# Patient Record
Sex: MALE
[De-identification: names, ages, dates, MRNs, and addresses within clinical notes are randomized per-mention and may not be internally consistent; named-entity substitution may affect disease eponyms.]

---

## 2023-04-12 ENCOUNTER — RESULT REVIEW (OUTPATIENT)
Age: 38
End: 2023-04-12

## 2023-04-12 ENCOUNTER — NON-APPOINTMENT (OUTPATIENT)
Age: 38
End: 2023-04-12

## 2023-04-12 ENCOUNTER — APPOINTMENT (OUTPATIENT)
Dept: FAMILY MEDICINE | Facility: CLINIC | Age: 38
End: 2023-04-12
Payer: MEDICAID

## 2023-04-12 VITALS
DIASTOLIC BLOOD PRESSURE: 98 MMHG | HEART RATE: 124 BPM | BODY MASS INDEX: 31.92 KG/M2 | SYSTOLIC BLOOD PRESSURE: 150 MMHG | HEIGHT: 71 IN | OXYGEN SATURATION: 98 % | TEMPERATURE: 98.3 F | WEIGHT: 228 LBS

## 2023-04-12 DIAGNOSIS — Z00.00 ENCOUNTER FOR GENERAL ADULT MEDICAL EXAMINATION W/OUT ABNORMAL FINDINGS: ICD-10-CM

## 2023-04-12 DIAGNOSIS — Z15.89 GENETIC SUSCEPTIBILITY TO OTHER DISEASE: ICD-10-CM

## 2023-04-12 DIAGNOSIS — Z13.29 ENCOUNTER FOR SCREENING FOR OTHER SUSPECTED ENDOCRINE DISORDER: ICD-10-CM

## 2023-04-12 DIAGNOSIS — Z13.1 ENCOUNTER FOR SCREENING FOR DIABETES MELLITUS: ICD-10-CM

## 2023-04-12 DIAGNOSIS — F32.A DEPRESSION, UNSPECIFIED: ICD-10-CM

## 2023-04-12 DIAGNOSIS — Z78.9 OTHER SPECIFIED HEALTH STATUS: ICD-10-CM

## 2023-04-12 DIAGNOSIS — Z13.220 ENCOUNTER FOR SCREENING FOR LIPOID DISORDERS: ICD-10-CM

## 2023-04-12 DIAGNOSIS — Z87.19 PERSONAL HISTORY OF OTHER DISEASES OF THE DIGESTIVE SYSTEM: ICD-10-CM

## 2023-04-12 DIAGNOSIS — G89.29 LOW BACK PAIN, UNSPECIFIED: ICD-10-CM

## 2023-04-12 DIAGNOSIS — K76.0 FATTY (CHANGE OF) LIVER, NOT ELSEWHERE CLASSIFIED: ICD-10-CM

## 2023-04-12 DIAGNOSIS — K62.5 HEMORRHAGE OF ANUS AND RECTUM: ICD-10-CM

## 2023-04-12 DIAGNOSIS — Z82.69 FAMILY HISTORY OF OTHER DISEASES OF THE MUSCULOSKELETAL SYSTEM AND CONNECTIVE TISSUE: ICD-10-CM

## 2023-04-12 DIAGNOSIS — M54.50 LOW BACK PAIN, UNSPECIFIED: ICD-10-CM

## 2023-04-12 DIAGNOSIS — F17.200 NICOTINE DEPENDENCE, UNSPECIFIED, UNCOMPLICATED: ICD-10-CM

## 2023-04-12 DIAGNOSIS — R73.03 PREDIABETES.: ICD-10-CM

## 2023-04-12 PROCEDURE — 93000 ELECTROCARDIOGRAM COMPLETE: CPT

## 2023-04-12 PROCEDURE — 99214 OFFICE O/P EST MOD 30 MIN: CPT | Mod: 25

## 2023-04-12 PROCEDURE — 99385 PREV VISIT NEW AGE 18-39: CPT | Mod: 25

## 2023-04-12 NOTE — PHYSICAL EXAM
[No Acute Distress] : no acute distress [Well Nourished] : well nourished [Well Developed] : well developed [Well-Appearing] : well-appearing [Normal Voice/Communication] : normal voice/communication [Normal Sclera/Conjunctiva] : normal sclera/conjunctiva [PERRL] : pupils equal round and reactive to light [EOMI] : extraocular movements intact [Normal Outer Ear/Nose] : the outer ears and nose were normal in appearance [Normal Oropharynx] : the oropharynx was normal [No JVD] : no jugular venous distention [No Lymphadenopathy] : no lymphadenopathy [Supple] : supple [Thyroid Normal, No Nodules] : the thyroid was normal and there were no nodules present [No Respiratory Distress] : no respiratory distress  [No Accessory Muscle Use] : no accessory muscle use [Clear to Auscultation] : lungs were clear to auscultation bilaterally [Regular Rhythm] : with a regular rhythm [Normal S1, S2] : normal S1 and S2 [No Murmur] : no murmur heard [No Carotid Bruits] : no carotid bruits [No Abdominal Bruit] : a ~M bruit was not heard ~T in the abdomen [No Varicosities] : no varicosities [Pedal Pulses Present] : the pedal pulses are present [No Edema] : there was no peripheral edema [No Palpable Aorta] : no palpable aorta [No Extremity Clubbing/Cyanosis] : no extremity clubbing/cyanosis [Soft] : abdomen soft [Non Tender] : non-tender [Non-distended] : non-distended [No Masses] : no abdominal mass palpated [No HSM] : no HSM [Normal Bowel Sounds] : normal bowel sounds [Normal Posterior Cervical Nodes] : no posterior cervical lymphadenopathy [Normal Anterior Cervical Nodes] : no anterior cervical lymphadenopathy [No CVA Tenderness] : no CVA  tenderness [No Spinal Tenderness] : no spinal tenderness [No Joint Swelling] : no joint swelling [Grossly Normal Strength/Tone] : grossly normal strength/tone [No Rash] : no rash [Coordination Grossly Intact] : coordination grossly intact [No Focal Deficits] : no focal deficits [Normal Gait] : normal gait [Speech Grossly Normal] : speech grossly normal [Normal Affect] : the affect was normal [Alert and Oriented x3] : oriented to person, place, and time [Normal Mood] : the mood was normal [Normal Insight/Judgement] : insight and judgment were intact [de-identified] : tachycardic [de-identified] : hypertonic paraspinal muscles

## 2023-04-12 NOTE — COUNSELING
[Hazards of at-risk alcohol use discussed] : Hazards of at-risk alcohol use discussed [Strategies to reduce or eliminate alcohol use discussed] : Strategies to reduce or eliminate alcohol use discussed [Quit Drinking] : Quit Drinking

## 2023-04-12 NOTE — ASSESSMENT
[FreeTextEntry1] : BP and hr elevated - discussed low salt diet and hydration , follow up in 2 weeks for BP check \par

## 2023-04-12 NOTE — HEALTH RISK ASSESSMENT
[Good] : ~his/her~  mood as  good [Yes] : Yes [No] : In the past 12 months have you used drugs other than those required for medical reasons? No [No falls in past year] : Patient reported no falls in the past year [0] : 2) Feeling down, depressed, or hopeless: Not at all (0) [None] : None [Alone] : lives alone [Unemployed] : unemployed [College] : College [Significant Other] : lives with significant other [Sexually Active] : sexually active [Feels Safe at Home] : Feels safe at home [Fully functional (bathing, dressing, toileting, transferring, walking, feeding)] : Fully functional (bathing, dressing, toileting, transferring, walking, feeding) [Fully functional (using the telephone, shopping, preparing meals, housekeeping, doing laundry, using] : Fully functional and needs no help or supervision to perform IADLs (using the telephone, shopping, preparing meals, housekeeping, doing laundry, using transportation, managing medications and managing finances) [de-identified] : 2-3 times a week  [Reports changes in hearing] : Reports no changes in hearing [Reports changes in vision] : Reports no changes in vision [Reports changes in dental health] : Reports no changes in dental health [de-identified] : female

## 2023-04-26 ENCOUNTER — OUTPATIENT (OUTPATIENT)
Dept: OUTPATIENT SERVICES | Facility: HOSPITAL | Age: 38
LOS: 1 days | End: 2023-04-26
Payer: MEDICAID

## 2023-04-26 ENCOUNTER — APPOINTMENT (OUTPATIENT)
Dept: RADIOLOGY | Facility: CLINIC | Age: 38
End: 2023-04-26
Payer: MEDICAID

## 2023-04-26 DIAGNOSIS — M54.50 LOW BACK PAIN, UNSPECIFIED: ICD-10-CM

## 2023-04-26 PROCEDURE — 72100 X-RAY EXAM L-S SPINE 2/3 VWS: CPT

## 2023-04-26 PROCEDURE — 72100 X-RAY EXAM L-S SPINE 2/3 VWS: CPT | Mod: 26

## 2023-04-27 LAB
ALBUMIN SERPL ELPH-MCNC: 4.6 G/DL
ALP BLD-CCNC: 71 U/L
ALT SERPL-CCNC: 182 U/L
ANION GAP SERPL CALC-SCNC: 14 MMOL/L
APPEARANCE: CLEAR
AST SERPL-CCNC: 66 U/L
BACTERIA: NEGATIVE /HPF
BASOPHILS # BLD AUTO: 0.07 K/UL
BASOPHILS NFR BLD AUTO: 0.7 %
BILIRUB SERPL-MCNC: 0.6 MG/DL
BILIRUBIN URINE: NEGATIVE
BLOOD URINE: NEGATIVE
BUN SERPL-MCNC: 12 MG/DL
CALCIUM SERPL-MCNC: 10.3 MG/DL
CAST: 0 /LPF
CHLORIDE SERPL-SCNC: 99 MMOL/L
CHOLEST SERPL-MCNC: 264 MG/DL
CO2 SERPL-SCNC: 24 MMOL/L
COLOR: YELLOW
CREAT SERPL-MCNC: 1.09 MG/DL
EGFR: 89 ML/MIN/1.73M2
EOSINOPHIL # BLD AUTO: 0.27 K/UL
EOSINOPHIL NFR BLD AUTO: 2.9 %
EPITHELIAL CELLS: 0 /HPF
ESTIMATED AVERAGE GLUCOSE: 120 MG/DL
GLUCOSE QUALITATIVE U: NEGATIVE MG/DL
GLUCOSE SERPL-MCNC: 107 MG/DL
HBA1C MFR BLD HPLC: 5.8 %
HBV CORE IGG+IGM SER QL: NONREACTIVE
HBV CORE IGM SER QL: NONREACTIVE
HBV SURFACE AB SER QL: REACTIVE
HBV SURFACE AG SER QL: NONREACTIVE
HCT VFR BLD CALC: 48.7 %
HCV AB SER QL: NONREACTIVE
HCV S/CO RATIO: 0.17 S/CO
HDLC SERPL-MCNC: 46 MG/DL
HGB BLD-MCNC: 16.7 G/DL
IMM GRANULOCYTES NFR BLD AUTO: 0.3 %
KETONES URINE: NEGATIVE MG/DL
LDLC SERPL CALC-MCNC: 163 MG/DL
LEUKOCYTE ESTERASE URINE: NEGATIVE
LYMPHOCYTES # BLD AUTO: 3.32 K/UL
LYMPHOCYTES NFR BLD AUTO: 35.4 %
MAN DIFF?: NORMAL
MCHC RBC-ENTMCNC: 32.7 PG
MCHC RBC-ENTMCNC: 34.3 GM/DL
MCV RBC AUTO: 95.3 FL
MICROSCOPIC-UA: NORMAL
MONOCYTES # BLD AUTO: 0.99 K/UL
MONOCYTES NFR BLD AUTO: 10.6 %
NEUTROPHILS # BLD AUTO: 4.69 K/UL
NEUTROPHILS NFR BLD AUTO: 50.1 %
NITRITE URINE: NEGATIVE
NONHDLC SERPL-MCNC: 218 MG/DL
PH URINE: 5.5
PLATELET # BLD AUTO: 289 K/UL
POTASSIUM SERPL-SCNC: 4.3 MMOL/L
PROT SERPL-MCNC: 7.8 G/DL
PROTEIN URINE: NEGATIVE MG/DL
RBC # BLD: 5.11 M/UL
RBC # FLD: 12.3 %
RED BLOOD CELLS URINE: 0 /HPF
SODIUM SERPL-SCNC: 137 MMOL/L
SPECIFIC GRAVITY URINE: 1.02
TRIGL SERPL-MCNC: 277 MG/DL
TSH SERPL-ACNC: 2.21 UIU/ML
UROBILINOGEN URINE: 0.2 MG/DL
WBC # FLD AUTO: 9.37 K/UL
WHITE BLOOD CELLS URINE: 0 /HPF

## 2023-05-03 ENCOUNTER — LABORATORY RESULT (OUTPATIENT)
Age: 38
End: 2023-05-03

## 2023-05-05 ENCOUNTER — APPOINTMENT (OUTPATIENT)
Dept: FAMILY MEDICINE | Facility: CLINIC | Age: 38
End: 2023-05-05

## 2023-05-08 ENCOUNTER — NON-APPOINTMENT (OUTPATIENT)
Age: 38
End: 2023-05-08

## 2023-05-09 LAB
ALBUMIN SERPL ELPH-MCNC: 4.8 G/DL
ALP BLD-CCNC: 74 U/L
ALT SERPL-CCNC: 320 U/L
AST SERPL-CCNC: 195 U/L
BILIRUB DIRECT SERPL-MCNC: 0.3 MG/DL
BILIRUB INDIRECT SERPL-MCNC: 0.9 MG/DL
BILIRUB SERPL-MCNC: 1.2 MG/DL
PROT SERPL-MCNC: 7.8 G/DL

## 2023-05-10 ENCOUNTER — APPOINTMENT (OUTPATIENT)
Dept: ULTRASOUND IMAGING | Facility: CLINIC | Age: 38
End: 2023-05-10
Payer: MEDICAID

## 2023-05-10 ENCOUNTER — OUTPATIENT (OUTPATIENT)
Dept: OUTPATIENT SERVICES | Facility: HOSPITAL | Age: 38
LOS: 1 days | End: 2023-05-10
Payer: MEDICAID

## 2023-05-10 DIAGNOSIS — R79.89 OTHER SPECIFIED ABNORMAL FINDINGS OF BLOOD CHEMISTRY: ICD-10-CM

## 2023-05-10 DIAGNOSIS — Z00.8 ENCOUNTER FOR OTHER GENERAL EXAMINATION: ICD-10-CM

## 2023-05-10 PROCEDURE — 76700 US EXAM ABDOM COMPLETE: CPT

## 2023-05-10 PROCEDURE — 76700 US EXAM ABDOM COMPLETE: CPT | Mod: 26

## 2023-05-11 ENCOUNTER — NON-APPOINTMENT (OUTPATIENT)
Age: 38
End: 2023-05-11

## 2023-05-12 ENCOUNTER — NON-APPOINTMENT (OUTPATIENT)
Age: 38
End: 2023-05-12

## 2023-05-12 ENCOUNTER — APPOINTMENT (OUTPATIENT)
Dept: FAMILY MEDICINE | Facility: CLINIC | Age: 38
End: 2023-05-12

## 2023-05-12 LAB
ALBUMIN SERPL ELPH-MCNC: 4.6 G/DL
ALP BLD-CCNC: 65 U/L
ALT SERPL-CCNC: 406 U/L
AST SERPL-CCNC: 169 U/L
BILIRUB DIRECT SERPL-MCNC: 0.2 MG/DL
BILIRUB INDIRECT SERPL-MCNC: 0.4 MG/DL
BILIRUB SERPL-MCNC: 0.6 MG/DL
PROT SERPL-MCNC: 7.5 G/DL

## 2023-07-06 ENCOUNTER — RX ONLY (RX ONLY)
Age: 38
End: 2023-07-06

## 2023-07-06 ENCOUNTER — OFFICE (OUTPATIENT)
Dept: URBAN - METROPOLITAN AREA CLINIC 12 | Facility: CLINIC | Age: 38
Setting detail: OPHTHALMOLOGY
End: 2023-07-06
Payer: COMMERCIAL

## 2023-07-06 DIAGNOSIS — H16.221: ICD-10-CM

## 2023-07-06 DIAGNOSIS — H20.011: ICD-10-CM

## 2023-07-06 PROCEDURE — 92002 INTRM OPH EXAM NEW PATIENT: CPT | Performed by: STUDENT IN AN ORGANIZED HEALTH CARE EDUCATION/TRAINING PROGRAM

## 2023-07-06 ASSESSMENT — SUPERFICIAL PUNCTATE KERATITIS (SPK): OD_SPK: 1+

## 2023-07-06 ASSESSMENT — CONFRONTATIONAL VISUAL FIELD TEST (CVF)
OS_FINDINGS: FULL
OD_FINDINGS: FULL

## 2023-07-06 ASSESSMENT — KERATOMETRY
OS_K2POWER_DIOPTERS: 44.75
OS_K1POWER_DIOPTERS: 44.75
OD_K2POWER_DIOPTERS: 45.25
OS_AXISANGLE_DEGREES: 090
OD_AXISANGLE_DEGREES: 090
METHOD_AUTO_MANUAL: AUTO
OD_K1POWER_DIOPTERS: 45.25

## 2023-07-06 ASSESSMENT — REFRACTION_MANIFEST
OS_CYLINDER: -0.25
OD_AXIS: 95
OS_VA1: 20/20
OD_CYLINDER: -0.50
OS_AXIS: 100
OD_VA1: 20/20
OS_SPHERE: -3.00
OD_SPHERE: -2.75

## 2023-07-06 ASSESSMENT — SPHEQUIV_DERIVED
OS_SPHEQUIV: -3.125
OD_SPHEQUIV: -3
OD_SPHEQUIV: -3.625
OS_SPHEQUIV: -3.375

## 2023-07-06 ASSESSMENT — CORNEAL TRAUMA - ABRASION: OD_ABRASION: ABSENT

## 2023-07-06 ASSESSMENT — REFRACTION_AUTOREFRACTION
OD_CYLINDER: -0.75
OS_CYLINDER: -0.25
OD_SPHERE: -3.25
OS_SPHERE: -3.25
OS_AXIS: 001
OD_AXIS: 032

## 2023-07-06 ASSESSMENT — DRY EYES - PHYSICIAN NOTES: OD_GENERALCOMMENTS: INFERIOR

## 2023-07-06 ASSESSMENT — VISUAL ACUITY
OD_BCVA: 20/20-2
OS_BCVA: 20/50-2

## 2023-07-06 ASSESSMENT — AXIALLENGTH_DERIVED
OS_AL: 24.3755
OD_AL: 24.3874
OS_AL: 24.4799
OD_AL: 24.1299

## 2023-07-09 ENCOUNTER — OFFICE (OUTPATIENT)
Dept: URBAN - METROPOLITAN AREA CLINIC 12 | Facility: CLINIC | Age: 38
Setting detail: OPHTHALMOLOGY
End: 2023-07-09
Payer: COMMERCIAL

## 2023-07-09 ENCOUNTER — RX ONLY (RX ONLY)
Age: 38
End: 2023-07-09

## 2023-07-09 DIAGNOSIS — H20.011: ICD-10-CM

## 2023-07-09 DIAGNOSIS — H16.221: ICD-10-CM

## 2023-07-09 DIAGNOSIS — H52.13: ICD-10-CM

## 2023-07-09 PROCEDURE — 99213 OFFICE O/P EST LOW 20 MIN: CPT | Performed by: OPTOMETRIST

## 2023-07-09 PROCEDURE — 92015 DETERMINE REFRACTIVE STATE: CPT | Performed by: OPTOMETRIST

## 2023-07-09 ASSESSMENT — REFRACTION_MANIFEST
OS_VA1: 20/20
OS_SPHERE: -3.00
OD_SPHERE: -2.50
OD_CYLINDER: SPHERE
OD_VA1: 20/20
OS_CYLINDER: SPHERE

## 2023-07-09 ASSESSMENT — AXIALLENGTH_DERIVED
OD_AL: 24.4649
OS_AL: 24.6792

## 2023-07-09 ASSESSMENT — CORNEAL TRAUMA - ABRASION: OD_ABRASION: ABSENT

## 2023-07-09 ASSESSMENT — KERATOMETRY
OD_AXISANGLE_DEGREES: 172
OS_AXISANGLE_DEGREES: 090
OS_K1POWER_DIOPTERS: 44.25
OS_K2POWER_DIOPTERS: 44.25
METHOD_AUTO_MANUAL: AUTO
OD_K1POWER_DIOPTERS: 44.00
OD_K2POWER_DIOPTERS: 44.25

## 2023-07-09 ASSESSMENT — REFRACTION_AUTOREFRACTION
OD_CYLINDER: -0.50
OD_SPHERE: -2.50
OS_CYLINDER: -0.25
OS_AXIS: 006
OD_AXIS: 097
OS_SPHERE: -3.25

## 2023-07-09 ASSESSMENT — DRY EYES - PHYSICIAN NOTES: OD_GENERALCOMMENTS: INFERIOR

## 2023-07-09 ASSESSMENT — SPHEQUIV_DERIVED
OS_SPHEQUIV: -3.375
OD_SPHEQUIV: -2.75

## 2023-07-09 ASSESSMENT — CONFRONTATIONAL VISUAL FIELD TEST (CVF)
OD_FINDINGS: FULL
OS_FINDINGS: FULL

## 2023-07-09 ASSESSMENT — VISUAL ACUITY
OS_BCVA: 20/125
OD_BCVA: 20/150

## 2023-07-09 ASSESSMENT — TONOMETRY
OS_IOP_MMHG: 18
OD_IOP_MMHG: 18

## 2023-07-09 ASSESSMENT — SUPERFICIAL PUNCTATE KERATITIS (SPK): OD_SPK: 1+

## 2023-07-18 ENCOUNTER — APPOINTMENT (OUTPATIENT)
Dept: FAMILY MEDICINE | Facility: CLINIC | Age: 38
End: 2023-07-18

## 2023-07-20 ENCOUNTER — OFFICE (OUTPATIENT)
Dept: URBAN - METROPOLITAN AREA CLINIC 12 | Facility: CLINIC | Age: 38
Setting detail: OPHTHALMOLOGY
End: 2023-07-20
Payer: COMMERCIAL

## 2023-07-20 DIAGNOSIS — H16.221: ICD-10-CM

## 2023-07-20 DIAGNOSIS — H20.011: ICD-10-CM

## 2023-07-20 PROCEDURE — 92012 INTRM OPH EXAM EST PATIENT: CPT | Performed by: STUDENT IN AN ORGANIZED HEALTH CARE EDUCATION/TRAINING PROGRAM

## 2023-07-20 ASSESSMENT — CONFRONTATIONAL VISUAL FIELD TEST (CVF)
OD_FINDINGS: FULL
OS_FINDINGS: FULL

## 2023-07-20 ASSESSMENT — REFRACTION_MANIFEST
OD_CYLINDER: SPHERE
OS_SPHERE: -3.00
OD_SPHERE: -3.25
OD_CYLINDER: SPH
OS_CYLINDER: SPHERE
OD_VA1: 20/20
OD_VA1: 20/15
OS_SPHERE: -3.25
OS_CYLINDER: SPH
OD_SPHERE: -3.00
OS_VA1: 20/15
OS_VA1: 20/20
OD_SPHERE: -2.50
OD_CYLINDER: SPHERE
OD_VA1: 20/15
OS_CYLINDER: SPHERE
OS_VA1: 20/15
OS_SPHERE: -3.00

## 2023-07-20 ASSESSMENT — TONOMETRY
OD_IOP_MMHG: 17
OS_IOP_MMHG: 17

## 2023-07-20 ASSESSMENT — SUPERFICIAL PUNCTATE KERATITIS (SPK): OD_SPK: T

## 2023-07-20 ASSESSMENT — REFRACTION_CURRENTRX
OS_CYLINDER: SPHERE
OD_OVR_VA: 20/
OS_OVR_VA: 20/
OD_SPHERE: -2.50
OS_SPHERE: -3.00
OD_CYLINDER: SPHERE

## 2023-07-20 ASSESSMENT — VISUAL ACUITY
OD_BCVA: 20/20
OS_BCVA: 20/20

## 2023-07-20 ASSESSMENT — KERATOMETRY: METHOD_AUTO_MANUAL: AUTO

## 2023-07-20 ASSESSMENT — DRY EYES - PHYSICIAN NOTES: OD_GENERALCOMMENTS: INFERIOR

## 2023-07-20 ASSESSMENT — CORNEAL TRAUMA - ABRASION: OD_ABRASION: ABSENT

## 2023-11-03 ENCOUNTER — APPOINTMENT (OUTPATIENT)
Dept: FAMILY MEDICINE | Facility: CLINIC | Age: 38
End: 2023-11-03

## 2023-11-06 ENCOUNTER — NON-APPOINTMENT (OUTPATIENT)
Age: 38
End: 2023-11-06

## 2024-01-31 ENCOUNTER — APPOINTMENT (OUTPATIENT)
Dept: FAMILY MEDICINE | Facility: CLINIC | Age: 39
End: 2024-01-31
Payer: MEDICAID

## 2024-01-31 VITALS
SYSTOLIC BLOOD PRESSURE: 130 MMHG | TEMPERATURE: 97.9 F | OXYGEN SATURATION: 98 % | WEIGHT: 249 LBS | HEART RATE: 98 BPM | DIASTOLIC BLOOD PRESSURE: 88 MMHG | BODY MASS INDEX: 34.73 KG/M2

## 2024-01-31 DIAGNOSIS — G47.00 INSOMNIA, UNSPECIFIED: ICD-10-CM

## 2024-01-31 DIAGNOSIS — R79.89 OTHER SPECIFIED ABNORMAL FINDINGS OF BLOOD CHEMISTRY: ICD-10-CM

## 2024-01-31 DIAGNOSIS — R94.31 ABNORMAL ELECTROCARDIOGRAM [ECG] [EKG]: ICD-10-CM

## 2024-01-31 DIAGNOSIS — R06.81 APNEA, NOT ELSEWHERE CLASSIFIED: ICD-10-CM

## 2024-01-31 PROCEDURE — 99214 OFFICE O/P EST MOD 30 MIN: CPT | Mod: 25

## 2024-01-31 RX ORDER — METHOCARBAMOL 750 MG/1
750 TABLET, FILM COATED ORAL
Qty: 30 | Refills: 0 | Status: DISCONTINUED | COMMUNITY
Start: 2023-04-12 | End: 2024-01-31

## 2024-01-31 NOTE — HISTORY OF PRESENT ILLNESS
[FreeTextEntry1] : Patient here for a follow up on BP [de-identified] : states drinking less for the past several months - admits to only 2 drinks several days ago  no chest pain, no sob, no cough, no fever, no dizziness, no abdominal pain, no n/v/d/c/melena/brbpr/hematuria/dysuria admits to insomnia, daytime somnolence , snoring and witnessed apneic episode during sleep  states would like to see cardiology given hx of htn and now weight gain

## 2024-02-06 LAB
ALBUMIN SERPL ELPH-MCNC: 4.8 G/DL
ALP BLD-CCNC: 69 U/L
ALT SERPL-CCNC: 200 U/L
ANION GAP SERPL CALC-SCNC: 11 MMOL/L
APPEARANCE: ABNORMAL
AST SERPL-CCNC: 79 U/L
BACTERIA: NEGATIVE /HPF
BASOPHILS # BLD AUTO: 0.04 K/UL
BASOPHILS NFR BLD AUTO: 0.4 %
BILIRUB SERPL-MCNC: 0.8 MG/DL
BILIRUBIN URINE: NEGATIVE
BLOOD URINE: NEGATIVE
BUN SERPL-MCNC: 13 MG/DL
CALCIUM SERPL-MCNC: 9.7 MG/DL
CAST: 0 /LPF
CHLORIDE SERPL-SCNC: 100 MMOL/L
CHOLEST SERPL-MCNC: 233 MG/DL
CO2 SERPL-SCNC: 28 MMOL/L
COLOR: YELLOW
CREAT SERPL-MCNC: 1.11 MG/DL
EGFR: 87 ML/MIN/1.73M2
EOSINOPHIL # BLD AUTO: 0.15 K/UL
EOSINOPHIL NFR BLD AUTO: 1.4 %
EPITHELIAL CELLS: 0 /HPF
ESTIMATED AVERAGE GLUCOSE: 114 MG/DL
GLUCOSE QUALITATIVE U: NEGATIVE MG/DL
GLUCOSE SERPL-MCNC: 138 MG/DL
HBA1C MFR BLD HPLC: 5.6 %
HCT VFR BLD CALC: 47.2 %
HDLC SERPL-MCNC: 48 MG/DL
HGB BLD-MCNC: 15.8 G/DL
IMM GRANULOCYTES NFR BLD AUTO: 0.6 %
KETONES URINE: NEGATIVE MG/DL
LDLC SERPL CALC-MCNC: 153 MG/DL
LEUKOCYTE ESTERASE URINE: NEGATIVE
LYMPHOCYTES # BLD AUTO: 2.3 K/UL
LYMPHOCYTES NFR BLD AUTO: 21.8 %
MAN DIFF?: NORMAL
MCHC RBC-ENTMCNC: 32 PG
MCHC RBC-ENTMCNC: 33.5 GM/DL
MCV RBC AUTO: 95.5 FL
MICROSCOPIC-UA: NORMAL
MONOCYTES # BLD AUTO: 0.79 K/UL
MONOCYTES NFR BLD AUTO: 7.5 %
NEUTROPHILS # BLD AUTO: 7.19 K/UL
NEUTROPHILS NFR BLD AUTO: 68.3 %
NITRITE URINE: NEGATIVE
NONHDLC SERPL-MCNC: 186 MG/DL
PH URINE: 5.5
PLATELET # BLD AUTO: 286 K/UL
POTASSIUM SERPL-SCNC: 5.1 MMOL/L
PROT SERPL-MCNC: 7.5 G/DL
PROTEIN URINE: NEGATIVE MG/DL
RBC # BLD: 4.94 M/UL
RBC # FLD: 12.8 %
RED BLOOD CELLS URINE: 1 /HPF
SODIUM SERPL-SCNC: 139 MMOL/L
SPECIFIC GRAVITY URINE: 1.02
TRIGL SERPL-MCNC: 179 MG/DL
TSH SERPL-ACNC: 1.46 UIU/ML
UROBILINOGEN URINE: 0.2 MG/DL
WBC # FLD AUTO: 10.53 K/UL
WHITE BLOOD CELLS URINE: 0 /HPF

## 2024-02-08 ENCOUNTER — NON-APPOINTMENT (OUTPATIENT)
Age: 39
End: 2024-02-08

## 2024-02-15 ENCOUNTER — APPOINTMENT (OUTPATIENT)
Dept: CARDIOLOGY | Facility: CLINIC | Age: 39
End: 2024-02-15

## 2024-12-18 ENCOUNTER — LABORATORY RESULT (OUTPATIENT)
Age: 39
End: 2024-12-18

## 2024-12-18 ENCOUNTER — APPOINTMENT (OUTPATIENT)
Dept: INTERNAL MEDICINE | Facility: CLINIC | Age: 39
End: 2024-12-18
Payer: MEDICAID

## 2024-12-18 VITALS
SYSTOLIC BLOOD PRESSURE: 120 MMHG | WEIGHT: 253 LBS | BODY MASS INDEX: 35.29 KG/M2 | TEMPERATURE: 98.3 F | OXYGEN SATURATION: 96 % | HEART RATE: 97 BPM | DIASTOLIC BLOOD PRESSURE: 86 MMHG

## 2024-12-18 DIAGNOSIS — Z13.220 ENCOUNTER FOR SCREENING FOR LIPOID DISORDERS: ICD-10-CM

## 2024-12-18 DIAGNOSIS — Z13.1 ENCOUNTER FOR SCREENING FOR DIABETES MELLITUS: ICD-10-CM

## 2024-12-18 DIAGNOSIS — Z13.29 ENCOUNTER FOR SCREENING FOR OTHER SUSPECTED ENDOCRINE DISORDER: ICD-10-CM

## 2024-12-18 DIAGNOSIS — Z00.00 ENCOUNTER FOR GENERAL ADULT MEDICAL EXAMINATION W/OUT ABNORMAL FINDINGS: ICD-10-CM
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
## 2024-12-18 LAB
25(OH)D3 SERPL-MCNC: 49.4 NG/ML
ALBUMIN SERPL ELPH-MCNC: 4.7 G/DL
ALP BLD-CCNC: 65 U/L
ALT SERPL-CCNC: 115 U/L
ANION GAP SERPL CALC-SCNC: 16 MMOL/L
APPEARANCE: CLEAR
AST SERPL-CCNC: 67 U/L
BACTERIA: NEGATIVE /HPF
BASOPHILS # BLD AUTO: 0.06 K/UL
BASOPHILS NFR BLD AUTO: 0.6 %
BILIRUB SERPL-MCNC: 1.1 MG/DL
BILIRUBIN URINE: NEGATIVE
BLOOD URINE: NEGATIVE
BUN SERPL-MCNC: 11 MG/DL
CALCIUM SERPL-MCNC: 10.3 MG/DL
CAST: 0 /LPF
CHLORIDE SERPL-SCNC: 99 MMOL/L
CHOLEST SERPL-MCNC: 234 MG/DL
CO2 SERPL-SCNC: 20 MMOL/L
COLOR: YELLOW
CREAT SERPL-MCNC: 0.97 MG/DL
EGFR: 102 ML/MIN/1.73M2
EOSINOPHIL # BLD AUTO: 0.22 K/UL
EOSINOPHIL NFR BLD AUTO: 2.2 %
EPITHELIAL CELLS: 0 /HPF
ESTIMATED AVERAGE GLUCOSE: 105 MG/DL
FERRITIN SERPL-MCNC: 130 NG/ML
FOLATE SERPL-MCNC: >20 NG/ML
GLUCOSE QUALITATIVE U: NEGATIVE MG/DL
GLUCOSE SERPL-MCNC: 93 MG/DL
HBA1C MFR BLD HPLC: 5.3 %
HCT VFR BLD CALC: 47.6 %
HDLC SERPL-MCNC: 48 MG/DL
HGB BLD-MCNC: 16 G/DL
IMM GRANULOCYTES NFR BLD AUTO: 0.3 %
IRON SATN MFR SERPL: 36 %
IRON SERPL-MCNC: 164 UG/DL
KETONES URINE: NEGATIVE MG/DL
LDLC SERPL CALC-MCNC: 155 MG/DL
LEUKOCYTE ESTERASE URINE: NEGATIVE
LYMPHOCYTES # BLD AUTO: 2.47 K/UL
LYMPHOCYTES NFR BLD AUTO: 24.3 %
MAN DIFF?: NORMAL
MCHC RBC-ENTMCNC: 32 PG
MCHC RBC-ENTMCNC: 33.6 G/DL
MCV RBC AUTO: 95.2 FL
MICROSCOPIC-UA: NORMAL
MONOCYTES # BLD AUTO: 1.16 K/UL
MONOCYTES NFR BLD AUTO: 11.4 %
NEUTROPHILS # BLD AUTO: 6.21 K/UL
NEUTROPHILS NFR BLD AUTO: 61.2 %
NITRITE URINE: NEGATIVE
NONHDLC SERPL-MCNC: 185 MG/DL
PH URINE: 7.5
PLATELET # BLD AUTO: 319 K/UL
POTASSIUM SERPL-SCNC: 4.7 MMOL/L
PROT SERPL-MCNC: 7.8 G/DL
PROTEIN URINE: NEGATIVE MG/DL
RBC # BLD: 5 M/UL
RBC # FLD: 13.2 %
RED BLOOD CELLS URINE: 0 /HPF
SODIUM SERPL-SCNC: 136 MMOL/L
SPECIFIC GRAVITY URINE: 1.01
TIBC SERPL-MCNC: 459 UG/DL
TRIGL SERPL-MCNC: 167 MG/DL
TSH SERPL-ACNC: 2.04 UIU/ML
UIBC SERPL-MCNC: 294 UG/DL
UROBILINOGEN URINE: 0.2 MG/DL
VIT B12 SERPL-MCNC: 611 PG/ML
WBC # FLD AUTO: 10.15 K/UL
WHITE BLOOD CELLS URINE: 0 /HPF

## 2024-12-18 PROCEDURE — 99395 PREV VISIT EST AGE 18-39: CPT

## 2024-12-23 ENCOUNTER — NON-APPOINTMENT (OUTPATIENT)
Age: 39
End: 2024-12-23

## 2024-12-25 PROBLEM — F10.90 ALCOHOL USE: Status: ACTIVE | Noted: 2023-04-12

## 2024-12-30 ENCOUNTER — NON-APPOINTMENT (OUTPATIENT)
Age: 39
End: 2024-12-30

## 2025-06-18 ENCOUNTER — APPOINTMENT (OUTPATIENT)
Dept: INTERNAL MEDICINE | Facility: CLINIC | Age: 40
End: 2025-06-18

## 2025-07-14 ENCOUNTER — APPOINTMENT (OUTPATIENT)
Dept: INTERNAL MEDICINE | Facility: CLINIC | Age: 40
End: 2025-07-14